# Patient Record
Sex: FEMALE | Race: OTHER | NOT HISPANIC OR LATINO | URBAN - METROPOLITAN AREA
[De-identification: names, ages, dates, MRNs, and addresses within clinical notes are randomized per-mention and may not be internally consistent; named-entity substitution may affect disease eponyms.]

---

## 2018-11-09 VITALS
DIASTOLIC BLOOD PRESSURE: 82 MMHG | TEMPERATURE: 98 F | HEART RATE: 76 BPM | SYSTOLIC BLOOD PRESSURE: 151 MMHG | OXYGEN SATURATION: 100 % | RESPIRATION RATE: 17 BRPM | WEIGHT: 126.1 LBS

## 2018-11-09 LAB
ALBUMIN SERPL ELPH-MCNC: 4.7 G/DL — SIGNIFICANT CHANGE UP (ref 3.3–5)
ALP SERPL-CCNC: 67 U/L — SIGNIFICANT CHANGE UP (ref 40–120)
ALT FLD-CCNC: 16 U/L — SIGNIFICANT CHANGE UP (ref 10–45)
ANION GAP SERPL CALC-SCNC: 16 MMOL/L — SIGNIFICANT CHANGE UP (ref 5–17)
APPEARANCE UR: CLEAR — SIGNIFICANT CHANGE UP
APTT BLD: 34.3 SEC — SIGNIFICANT CHANGE UP (ref 27.5–36.3)
AST SERPL-CCNC: 23 U/L — SIGNIFICANT CHANGE UP (ref 10–40)
BACTERIA # UR AUTO: PRESENT /HPF
BASOPHILS NFR BLD AUTO: 0.6 % — SIGNIFICANT CHANGE UP (ref 0–2)
BILIRUB SERPL-MCNC: 0.3 MG/DL — SIGNIFICANT CHANGE UP (ref 0.2–1.2)
BILIRUB UR-MCNC: NEGATIVE — SIGNIFICANT CHANGE UP
BUN SERPL-MCNC: 14 MG/DL — SIGNIFICANT CHANGE UP (ref 7–23)
CALCIUM SERPL-MCNC: 9.4 MG/DL — SIGNIFICANT CHANGE UP (ref 8.4–10.5)
CHLORIDE SERPL-SCNC: 95 MMOL/L — LOW (ref 96–108)
CO2 SERPL-SCNC: 23 MMOL/L — SIGNIFICANT CHANGE UP (ref 22–31)
COD CRY URNS QL: ABNORMAL /HPF
COLOR SPEC: YELLOW — SIGNIFICANT CHANGE UP
CREAT SERPL-MCNC: 0.75 MG/DL — SIGNIFICANT CHANGE UP (ref 0.5–1.3)
DIFF PNL FLD: ABNORMAL
EOSINOPHIL NFR BLD AUTO: 0.6 % — SIGNIFICANT CHANGE UP (ref 0–6)
GLUCOSE SERPL-MCNC: 109 MG/DL — HIGH (ref 70–99)
GLUCOSE UR QL: NEGATIVE — SIGNIFICANT CHANGE UP
HCT VFR BLD CALC: 41.2 % — SIGNIFICANT CHANGE UP (ref 34.5–45)
HGB BLD-MCNC: 13.5 G/DL — SIGNIFICANT CHANGE UP (ref 11.5–15.5)
INR BLD: 1.01 — SIGNIFICANT CHANGE UP (ref 0.88–1.16)
KETONES UR-MCNC: 15 MG/DL
LEUKOCYTE ESTERASE UR-ACNC: NEGATIVE — SIGNIFICANT CHANGE UP
LYMPHOCYTES # BLD AUTO: 23.1 % — SIGNIFICANT CHANGE UP (ref 13–44)
MCHC RBC-ENTMCNC: 28.6 PG — SIGNIFICANT CHANGE UP (ref 27–34)
MCHC RBC-ENTMCNC: 32.8 G/DL — SIGNIFICANT CHANGE UP (ref 32–36)
MCV RBC AUTO: 87.3 FL — SIGNIFICANT CHANGE UP (ref 80–100)
MONOCYTES NFR BLD AUTO: 7.7 % — SIGNIFICANT CHANGE UP (ref 2–14)
NEUTROPHILS NFR BLD AUTO: 68 % — SIGNIFICANT CHANGE UP (ref 43–77)
NITRITE UR-MCNC: NEGATIVE — SIGNIFICANT CHANGE UP
PH UR: 6.5 — SIGNIFICANT CHANGE UP (ref 5–8)
PLATELET # BLD AUTO: 217 K/UL — SIGNIFICANT CHANGE UP (ref 150–400)
POTASSIUM SERPL-MCNC: 4.3 MMOL/L — SIGNIFICANT CHANGE UP (ref 3.5–5.3)
POTASSIUM SERPL-SCNC: 4.3 MMOL/L — SIGNIFICANT CHANGE UP (ref 3.5–5.3)
PROT SERPL-MCNC: 7.3 G/DL — SIGNIFICANT CHANGE UP (ref 6–8.3)
PROT UR-MCNC: NEGATIVE MG/DL — SIGNIFICANT CHANGE UP
PROTHROM AB SERPL-ACNC: 11.4 SEC — SIGNIFICANT CHANGE UP (ref 10–12.9)
RBC # BLD: 4.72 M/UL — SIGNIFICANT CHANGE UP (ref 3.8–5.2)
RBC # FLD: 14.5 % — SIGNIFICANT CHANGE UP (ref 10.3–16.9)
RBC CASTS # UR COMP ASSIST: ABNORMAL /HPF
SODIUM SERPL-SCNC: 134 MMOL/L — LOW (ref 135–145)
SP GR SPEC: 1.02 — SIGNIFICANT CHANGE UP (ref 1–1.03)
UROBILINOGEN FLD QL: 0.2 E.U./DL — SIGNIFICANT CHANGE UP
WBC # BLD: 6.6 K/UL — SIGNIFICANT CHANGE UP (ref 3.8–10.5)
WBC # FLD AUTO: 6.6 K/UL — SIGNIFICANT CHANGE UP (ref 3.8–10.5)
WBC UR QL: < 5 /HPF — SIGNIFICANT CHANGE UP

## 2018-11-09 PROCEDURE — 99284 EMERGENCY DEPT VISIT MOD MDM: CPT

## 2018-11-09 PROCEDURE — 74177 CT ABD & PELVIS W/CONTRAST: CPT | Mod: 26

## 2018-11-09 RX ORDER — MORPHINE SULFATE 50 MG/1
4 CAPSULE, EXTENDED RELEASE ORAL ONCE
Qty: 0 | Refills: 0 | Status: DISCONTINUED | OUTPATIENT
Start: 2018-11-09 | End: 2018-11-11

## 2018-11-09 RX ORDER — IOHEXOL 300 MG/ML
30 INJECTION, SOLUTION INTRAVENOUS ONCE
Qty: 0 | Refills: 0 | Status: COMPLETED | OUTPATIENT
Start: 2018-11-09 | End: 2018-11-09

## 2018-11-09 RX ORDER — SODIUM CHLORIDE 9 MG/ML
1000 INJECTION INTRAMUSCULAR; INTRAVENOUS; SUBCUTANEOUS ONCE
Qty: 0 | Refills: 0 | Status: COMPLETED | OUTPATIENT
Start: 2018-11-09 | End: 2018-11-09

## 2018-11-09 RX ORDER — METOCLOPRAMIDE HCL 10 MG
10 TABLET ORAL ONCE
Qty: 0 | Refills: 0 | Status: DISCONTINUED | OUTPATIENT
Start: 2018-11-09 | End: 2018-11-11

## 2018-11-09 RX ORDER — ONDANSETRON 8 MG/1
4 TABLET, FILM COATED ORAL ONCE
Qty: 0 | Refills: 0 | Status: COMPLETED | OUTPATIENT
Start: 2018-11-09 | End: 2018-11-09

## 2018-11-09 RX ADMIN — SODIUM CHLORIDE 1000 MILLILITER(S): 9 INJECTION INTRAMUSCULAR; INTRAVENOUS; SUBCUTANEOUS at 23:54

## 2018-11-09 RX ADMIN — SODIUM CHLORIDE 1000 MILLILITER(S): 9 INJECTION INTRAMUSCULAR; INTRAVENOUS; SUBCUTANEOUS at 20:15

## 2018-11-09 RX ADMIN — IOHEXOL 30 MILLILITER(S): 300 INJECTION, SOLUTION INTRAVENOUS at 20:15

## 2018-11-09 RX ADMIN — ONDANSETRON 4 MILLIGRAM(S): 8 TABLET, FILM COATED ORAL at 21:13

## 2018-11-09 RX ADMIN — SODIUM CHLORIDE 1000 MILLILITER(S): 9 INJECTION INTRAMUSCULAR; INTRAVENOUS; SUBCUTANEOUS at 23:49

## 2018-11-09 NOTE — ED PROVIDER NOTE - OBJECTIVE STATEMENT
62 year old female with no significant PMH presents to ED for evaluation of abdominal pain.  Around 3PM she felt sudden onset of severe, sharp pain in her RLQ. Pain has been intermittent and same level since. She has some nausea but denies any vomiting. She denies associated fever, chills, diarrhea, constipation, urinary complaints. She has not taken anything for the pain. Had kidney stones and food poisoning in the past and feels this could be either of those problems.

## 2018-11-09 NOTE — ED PROVIDER NOTE - ATTENDING CONTRIBUTION TO CARE
63 yo F with RLQ x 4-5 hrs assoc with  nausea - no vomiting  no fevers or chills no flank pain- pos flatus and BM - Abd  pos RLQ tenderness mild guarding no rebound - no CVAT  A/P ? appy/SBO? - CT shows partial SBO >? dilated appendix surgery to eval for admission s/o to night team regarding dispo

## 2018-11-09 NOTE — ED PROVIDER NOTE - PROGRESS NOTE DETAILS
patient vomited most of oral contrast she had drank. Reglan 10 IVP ordered. Awaiting transport to CT. She is still refusing pain medication.

## 2018-11-09 NOTE — ED ADULT TRIAGE NOTE - CHIEF COMPLAINT QUOTE
Patient complaining of intermittent 8/10 right sided abdominal pain.  Patient denies any CP, SOB, dizziness, N/V/D, fevers, chills, cough or any other complaints at this time.

## 2018-11-09 NOTE — ED ADULT NURSE NOTE - NSIMPLEMENTINTERV_GEN_ALL_ED
Implemented All Universal Safety Interventions:  [place] to call system. Call bell, personal items and telephone within reach. Instruct patient to call for assistance. Room bathroom lighting operational. Non-slip footwear when patient is off stretcher. Physically safe environment: no spills, clutter or unnecessary equipment. Stretcher in lowest position, wheels locked, appropriate side rails in place.

## 2018-11-09 NOTE — ED PROVIDER NOTE - CARE PLAN
Principal Discharge DX:	Right lower quadrant abdominal pain Principal Discharge DX:	SBO (small bowel obstruction)

## 2018-11-10 ENCOUNTER — INPATIENT (INPATIENT)
Facility: HOSPITAL | Age: 63
LOS: 0 days | Discharge: HOME | DRG: 390 | End: 2018-11-11
Attending: STUDENT IN AN ORGANIZED HEALTH CARE EDUCATION/TRAINING PROGRAM | Admitting: STUDENT IN AN ORGANIZED HEALTH CARE EDUCATION/TRAINING PROGRAM
Payer: COMMERCIAL

## 2018-11-10 LAB
ANION GAP SERPL CALC-SCNC: 10 MMOL/L — SIGNIFICANT CHANGE UP (ref 5–17)
APTT BLD: 32.1 SEC — SIGNIFICANT CHANGE UP (ref 27.5–36.3)
BUN SERPL-MCNC: 7 MG/DL — SIGNIFICANT CHANGE UP (ref 7–23)
CALCIUM SERPL-MCNC: 8.8 MG/DL — SIGNIFICANT CHANGE UP (ref 8.4–10.5)
CHLORIDE SERPL-SCNC: 104 MMOL/L — SIGNIFICANT CHANGE UP (ref 96–108)
CO2 SERPL-SCNC: 24 MMOL/L — SIGNIFICANT CHANGE UP (ref 22–31)
CREAT SERPL-MCNC: 0.66 MG/DL — SIGNIFICANT CHANGE UP (ref 0.5–1.3)
GLUCOSE SERPL-MCNC: 95 MG/DL — SIGNIFICANT CHANGE UP (ref 70–99)
HCT VFR BLD CALC: 39 % — SIGNIFICANT CHANGE UP (ref 34.5–45)
HGB BLD-MCNC: 12.4 G/DL — SIGNIFICANT CHANGE UP (ref 11.5–15.5)
INR BLD: 1.03 — SIGNIFICANT CHANGE UP (ref 0.88–1.16)
MAGNESIUM SERPL-MCNC: 2.3 MG/DL — SIGNIFICANT CHANGE UP (ref 1.6–2.6)
MCHC RBC-ENTMCNC: 28.1 PG — SIGNIFICANT CHANGE UP (ref 27–34)
MCHC RBC-ENTMCNC: 31.8 G/DL — LOW (ref 32–36)
MCV RBC AUTO: 88.4 FL — SIGNIFICANT CHANGE UP (ref 80–100)
PHOSPHATE SERPL-MCNC: 3.1 MG/DL — SIGNIFICANT CHANGE UP (ref 2.5–4.5)
PLATELET # BLD AUTO: 202 K/UL — SIGNIFICANT CHANGE UP (ref 150–400)
POTASSIUM SERPL-MCNC: 3.6 MMOL/L — SIGNIFICANT CHANGE UP (ref 3.5–5.3)
POTASSIUM SERPL-SCNC: 3.6 MMOL/L — SIGNIFICANT CHANGE UP (ref 3.5–5.3)
PROTHROM AB SERPL-ACNC: 11.6 SEC — SIGNIFICANT CHANGE UP (ref 10–12.9)
RBC # BLD: 4.41 M/UL — SIGNIFICANT CHANGE UP (ref 3.8–5.2)
RBC # FLD: 14.7 % — SIGNIFICANT CHANGE UP (ref 10.3–16.9)
SODIUM SERPL-SCNC: 138 MMOL/L — SIGNIFICANT CHANGE UP (ref 135–145)
WBC # BLD: 5.5 K/UL — SIGNIFICANT CHANGE UP (ref 3.8–10.5)
WBC # FLD AUTO: 5.5 K/UL — SIGNIFICANT CHANGE UP (ref 3.8–10.5)

## 2018-11-10 PROCEDURE — 74019 RADEX ABDOMEN 2 VIEWS: CPT | Mod: 26

## 2018-11-10 RX ORDER — HEPARIN SODIUM 5000 [USP'U]/ML
5000 INJECTION INTRAVENOUS; SUBCUTANEOUS EVERY 8 HOURS
Qty: 0 | Refills: 0 | Status: DISCONTINUED | OUTPATIENT
Start: 2018-11-10 | End: 2018-11-11

## 2018-11-10 RX ORDER — POTASSIUM CHLORIDE 20 MEQ
10 PACKET (EA) ORAL
Qty: 0 | Refills: 0 | Status: COMPLETED | OUTPATIENT
Start: 2018-11-10 | End: 2018-11-10

## 2018-11-10 RX ORDER — PIPERACILLIN AND TAZOBACTAM 4; .5 G/20ML; G/20ML
3.38 INJECTION, POWDER, LYOPHILIZED, FOR SOLUTION INTRAVENOUS ONCE
Qty: 0 | Refills: 0 | Status: COMPLETED | OUTPATIENT
Start: 2018-11-10 | End: 2018-11-10

## 2018-11-10 RX ORDER — SODIUM CHLORIDE 9 MG/ML
1000 INJECTION, SOLUTION INTRAVENOUS
Qty: 0 | Refills: 0 | Status: DISCONTINUED | OUTPATIENT
Start: 2018-11-10 | End: 2018-11-11

## 2018-11-10 RX ADMIN — SODIUM CHLORIDE 1000 MILLILITER(S): 9 INJECTION INTRAMUSCULAR; INTRAVENOUS; SUBCUTANEOUS at 02:38

## 2018-11-10 RX ADMIN — PIPERACILLIN AND TAZOBACTAM 200 GRAM(S): 4; .5 INJECTION, POWDER, LYOPHILIZED, FOR SOLUTION INTRAVENOUS at 00:47

## 2018-11-10 RX ADMIN — PIPERACILLIN AND TAZOBACTAM 3.38 GRAM(S): 4; .5 INJECTION, POWDER, LYOPHILIZED, FOR SOLUTION INTRAVENOUS at 01:35

## 2018-11-10 RX ADMIN — HEPARIN SODIUM 5000 UNIT(S): 5000 INJECTION INTRAVENOUS; SUBCUTANEOUS at 14:00

## 2018-11-10 RX ADMIN — HEPARIN SODIUM 5000 UNIT(S): 5000 INJECTION INTRAVENOUS; SUBCUTANEOUS at 21:12

## 2018-11-10 RX ADMIN — Medication 100 MILLIEQUIVALENT(S): at 14:00

## 2018-11-10 RX ADMIN — Medication 100 MILLIEQUIVALENT(S): at 15:51

## 2018-11-10 RX ADMIN — SODIUM CHLORIDE 110 MILLILITER(S): 9 INJECTION, SOLUTION INTRAVENOUS at 04:34

## 2018-11-10 NOTE — H&P ADULT - NSHPPHYSICALEXAM_GEN_ALL_CORE
Vital Signs Last 24 Hrs  T(C): 36.9 (10 Nov 2018 02:17), Max: 36.9 (10 Nov 2018 02:17)  T(F): 98.5 (10 Nov 2018 02:17), Max: 98.5 (10 Nov 2018 02:17)  HR: 68 (10 Nov 2018 02:17) (68 - 76)  BP: 122/68 (10 Nov 2018 02:17) (122/68 - 151/82)  BP(mean): --  RR: 18 (10 Nov 2018 02:17) (17 - 18)  SpO2: 99% (10 Nov 2018 02:17) (99% - 100%)    General: A/O x4 NAD  Resp: CTABL  CV: RRR, No MRG  ABD: soft NT, ND

## 2018-11-10 NOTE — H&P ADULT - NSHPLABSRESULTS_GEN_ALL_CORE
13.5   6.6   )-----------( 217      ( 09 Nov 2018 20:15 )             41.2   11-09    134<L>  |  95<L>  |  14  ----------------------------<  109<H>  4.3   |  23  |  0.75    Ca    9.4      09 Nov 2018 20:15    TPro  7.3  /  Alb  4.7  /  TBili  0.3  /  DBili  x   /  AST  23  /  ALT  16  /  AlkPhos  67  11-09  < from: CT Abdomen and Pelvis w/ Oral Cont and w/ IV Cont (11.09.18 @ 22:20) >    FINDINGS:    Lower chest: Clear lung bases.     Liver: Smooth in contour. No focal lesion. Portal and hepatic veins are   patent.    Biliary system: Gallbladder is normal in size. No calcified gallstones.   No biliary ductal dilatation.    Pancreas: Unremarkable.    Spleen: Unremarkable.    Adrenal glands: Unremarkable.    Kidneys: Symmetric parenchymal enhancement. No renal mass. No   hydronephrosis. No renal or ureteral stone.   Urinary Bladder: Unremarkable.    Reproductive organs: Calcifications in the uterus consistent with   fibroid. No adnexal mass.    Bowel/Peritoneum: There are mildly dilated, stool-filled loops of small   bowel throughout the entire abdomen but more predominant on the right   side,with possible transition point at the distal ileum (axial slice   62).  This is likely a partial small bowel obstruction. Mild wall   thickening versus underdistention of the terminal ileum and entire colon,   possibly representing enterocolitis, infectious versus inflammatory. The   appendix is dilated to 0.8 cm with minimal periappendiceal fat stranding,   which is likely reactive. No ascites or extraluminal gas.     Lymph nodes: No lymphadenopathy.    Aorta/IVC: Normal caliber.    Abdominal wall: No hernia.    Bones/Soft tissues: Degenerative changes spine.       IMPRESSION:      There are mildly dilated, stool-filled loops of small bowel throughout   the entire abdomen but more predominant on the right side, with possible   transition point at the distal ileum (axial slice 62).  This is likely a   partial small bowel obstruction. Mild wall thickening versus   underdistention of the terminal ileum and entire colon, possibly   representing enterocolitis, infectious versus inflammatory. The appendix   is dilated to 0.8 cm with minimal periappendiceal fat stranding, which is   likely reactive. Early appendicitis cannot be excluded.     The  findings above were discussed with Dr. Lucas on 11/9/2018 at 11:55   PM.    < end of copied text >

## 2018-11-10 NOTE — H&P ADULT - ATTENDING COMMENTS
Patient seen and examined in ED  States pain is improved  Continue to pass gas  +BM (prior to CT)  Afebrile, Vitals stable  Abdomen soft, NT/ND    Labs reviewed: WBC 5.5  CT with evidence of partial small bowel obstruction and possible enterocolitis, infectious versus inflammatory. Early appendicitis cannot be excluded.    63 yo F with PMH nephrolithiasis and no PSH presents with acute onset abdominal pain, concern for SBO   - abdomen soft, passing gas, likely resolving partial SBO  - continue NPO/IVF  - f/u abdominal film  - discussed with patient the concern of bowel obstruction in setting of no past surgical history and that she may require diagnostic laparoscopy.  The patients exam has improved since admission and labs remain within normal limits.   Her abdomen is soft and non-distended.  Will continue to follow with bowel rest.  Early appendicitis read on CT likely reactive as patients pain has subsided. Patient seen and examined in ED  States pain is improved  Continue to pass gas  +BM (prior to CT)  Afebrile, Vitals stable  Abdomen soft, NT/ND    Labs reviewed: WBC 5.5  CT with evidence of partial small bowel obstruction and possible enterocolitis, infectious versus inflammatory. Early appendicitis cannot be excluded.    61 yo F with PMH nephrolithiasis and no PSH presents with acute onset abdominal pain, concern for SBO   - abdomen soft, passing gas, likely resolving partial SBO  - continue NPO/IVF  - f/u abdominal film  - discussed with patient the concern of bowel obstruction in setting of no past surgical history and that she may require diagnostic laparoscopy.  The patients exam has improved since admission and labs remain within normal limits.   Her abdomen is soft and non-distended.  Will continue to follow with bowel rest.  Early appendicitis read on CT likely reactive as patients pain has subsided.  If evidence of obstruction resolved on xray may feed.

## 2018-11-10 NOTE — H&P ADULT - ASSESSMENT
62 F PMHx renal stones, no PSHx presents with ABD pain x 1/2day, CT shows evidence of SBO    -Admit [facility], Dr Gold  -No Narcotics  -IVF  -NPO  -NGT if N/V  -serial abd exam  -SCD, SQH, OOB  -Discussed with Chief resident and Dr Gold

## 2018-11-10 NOTE — H&P ADULT - HISTORY OF PRESENT ILLNESS
62 F  PMHx kidney stones no PSx presents with abdominal pain x 1/2 day. Pain started suddenly at 4:30 pm as crampy gas like pain worse in RLQ and radiating to back and R flank with associated predominantly R side bloating. Denies N/V until drinking contrast and having large emesis and subsequent BM, with relief of pain. Denies F/C, CP, SOB, dysuria. Last c-scope 5 yrs ago was normal except for internal hemorrhoid. Denies family Hx colon ca. Denies personal and family Hx IBD.

## 2018-11-11 VITALS
OXYGEN SATURATION: 97 % | TEMPERATURE: 97 F | RESPIRATION RATE: 18 BRPM | SYSTOLIC BLOOD PRESSURE: 150 MMHG | HEART RATE: 71 BPM | DIASTOLIC BLOOD PRESSURE: 76 MMHG

## 2018-11-11 LAB
ANION GAP SERPL CALC-SCNC: 12 MMOL/L — SIGNIFICANT CHANGE UP (ref 5–17)
BUN SERPL-MCNC: 7 MG/DL — SIGNIFICANT CHANGE UP (ref 7–23)
CALCIUM SERPL-MCNC: 8.8 MG/DL — SIGNIFICANT CHANGE UP (ref 8.4–10.5)
CHLORIDE SERPL-SCNC: 104 MMOL/L — SIGNIFICANT CHANGE UP (ref 96–108)
CO2 SERPL-SCNC: 25 MMOL/L — SIGNIFICANT CHANGE UP (ref 22–31)
CREAT SERPL-MCNC: 0.72 MG/DL — SIGNIFICANT CHANGE UP (ref 0.5–1.3)
CULTURE RESULTS: SIGNIFICANT CHANGE UP
GLUCOSE SERPL-MCNC: 87 MG/DL — SIGNIFICANT CHANGE UP (ref 70–99)
HCT VFR BLD CALC: 36.5 % — SIGNIFICANT CHANGE UP (ref 34.5–45)
HGB BLD-MCNC: 11.7 G/DL — SIGNIFICANT CHANGE UP (ref 11.5–15.5)
MAGNESIUM SERPL-MCNC: 2.1 MG/DL — SIGNIFICANT CHANGE UP (ref 1.6–2.6)
MCHC RBC-ENTMCNC: 28.7 PG — SIGNIFICANT CHANGE UP (ref 27–34)
MCHC RBC-ENTMCNC: 32.1 G/DL — SIGNIFICANT CHANGE UP (ref 32–36)
MCV RBC AUTO: 89.7 FL — SIGNIFICANT CHANGE UP (ref 80–100)
PHOSPHATE SERPL-MCNC: 2.6 MG/DL — SIGNIFICANT CHANGE UP (ref 2.5–4.5)
PLATELET # BLD AUTO: 195 K/UL — SIGNIFICANT CHANGE UP (ref 150–400)
POTASSIUM SERPL-MCNC: 4 MMOL/L — SIGNIFICANT CHANGE UP (ref 3.5–5.3)
POTASSIUM SERPL-SCNC: 4 MMOL/L — SIGNIFICANT CHANGE UP (ref 3.5–5.3)
RBC # BLD: 4.07 M/UL — SIGNIFICANT CHANGE UP (ref 3.8–5.2)
RBC # FLD: 14.8 % — SIGNIFICANT CHANGE UP (ref 10.3–16.9)
SODIUM SERPL-SCNC: 141 MMOL/L — SIGNIFICANT CHANGE UP (ref 135–145)
SPECIMEN SOURCE: SIGNIFICANT CHANGE UP
WBC # BLD: 4.2 K/UL — SIGNIFICANT CHANGE UP (ref 3.8–10.5)
WBC # FLD AUTO: 4.2 K/UL — SIGNIFICANT CHANGE UP (ref 3.8–10.5)

## 2018-11-11 PROCEDURE — 83735 ASSAY OF MAGNESIUM: CPT

## 2018-11-11 PROCEDURE — 96365 THER/PROPH/DIAG IV INF INIT: CPT | Mod: XU

## 2018-11-11 PROCEDURE — 85025 COMPLETE CBC W/AUTO DIFF WBC: CPT

## 2018-11-11 PROCEDURE — 84100 ASSAY OF PHOSPHORUS: CPT

## 2018-11-11 PROCEDURE — 96361 HYDRATE IV INFUSION ADD-ON: CPT

## 2018-11-11 PROCEDURE — 85730 THROMBOPLASTIN TIME PARTIAL: CPT

## 2018-11-11 PROCEDURE — 99285 EMERGENCY DEPT VISIT HI MDM: CPT | Mod: 25

## 2018-11-11 PROCEDURE — 85027 COMPLETE CBC AUTOMATED: CPT

## 2018-11-11 PROCEDURE — 74177 CT ABD & PELVIS W/CONTRAST: CPT

## 2018-11-11 PROCEDURE — 85610 PROTHROMBIN TIME: CPT

## 2018-11-11 PROCEDURE — 87086 URINE CULTURE/COLONY COUNT: CPT

## 2018-11-11 PROCEDURE — 80053 COMPREHEN METABOLIC PANEL: CPT

## 2018-11-11 PROCEDURE — 81001 URINALYSIS AUTO W/SCOPE: CPT

## 2018-11-11 PROCEDURE — 80048 BASIC METABOLIC PNL TOTAL CA: CPT

## 2018-11-11 PROCEDURE — 36415 COLL VENOUS BLD VENIPUNCTURE: CPT

## 2018-11-11 PROCEDURE — 74019 RADEX ABDOMEN 2 VIEWS: CPT

## 2018-11-11 RX ADMIN — HEPARIN SODIUM 5000 UNIT(S): 5000 INJECTION INTRAVENOUS; SUBCUTANEOUS at 05:23

## 2018-11-11 NOTE — DISCHARGE NOTE ADULT - PLAN OF CARE
RECOVERY You do not have to follow up with Dr. Gold. However, you may if you want. Call the number provided below to make an appointment. You should remain on full liquid diet for 3-5 days and advance as tolerate. You may resume you daily activities.    You should return to ER if you are experiencing any fever, chills, nausea, vomiting, Chest pain, shortness of breath and uncontrolled pain.

## 2018-11-11 NOTE — PROGRESS NOTE ADULT - SUBJECTIVE AND OBJECTIVE BOX
SUBJECTIVE: Pt seen and examined at bedside with chief. Pt denies any complaints. Pain well controlled. Tolerating diet without N/V. Admits passing flatus    MEDICATIONS  (STANDING):  heparin  Injectable 5000 Unit(s) SubCutaneous every 8 hours    MEDICATIONS  (PRN):  metoclopramide Injectable 10 milliGRAM(s) IV Push once PRN nausea/vomiting  morphine  - Injectable 4 milliGRAM(s) IV Push Once PRN Severe Pain (7 - 10)      Vital Signs Last 24 Hrs  T(C): 36.1 (2018 08:30), Max: 36.8 (10 Nov 2018 11:49)  T(F): 97 (2018 08:30), Max: 98.2 (10 Nov 2018 11:49)  HR: 58 (2018 08:30) (58 - 68)  BP: 118/58 (2018 08:30) (92/52 - 129/62)  BP(mean): --  RR: 17 (2018 08:30) (15 - 18)  SpO2: 99% (2018 08:30) (97% - 100%)    PHYSICAL EXAM:      Constitutional: A&Ox3    Respiratory: non labored breathing, no respiratory distress    Cardiovascular: NSR, RRR    Gastrointestinal: soft ND,NT, No rebound or guarding    Genitourinary: voiding     Extremities: (-) edema                  I&O's Detail    10 Nov 2018 07:01  -  2018 07:00  --------------------------------------------------------  IN:    lactated ringers.: 1320 mL  Total IN: 1320 mL    OUT:    Voided: 800 mL  Total OUT: 800 mL    Total NET: 520 mL          LABS:                        11.7   4.2   )-----------( 195      ( 2018 07:18 )             36.5         141  |  104  |  7   ----------------------------<  87  4.0   |  25  |  0.72    Ca    8.8      2018 07:18  Phos  2.6       Mg     2.1         TPro  7.3  /  Alb  4.7  /  TBili  0.3  /  DBili  x   /  AST  23  /  ALT  16  /  AlkPhos  67      PT/INR - ( 10 Nov 2018 09:59 )   PT: 11.6 sec;   INR: 1.03          PTT - ( 10 Nov 2018 09:59 )  PTT:32.1 sec  Urinalysis Basic - ( 2018 19:32 )    Color: Yellow / Appearance: Clear / S.020 / pH: x  Gluc: x / Ketone: 15 mg/dL  / Bili: Negative / Urobili: 0.2 E.U./dL   Blood: x / Protein: NEGATIVE mg/dL / Nitrite: NEGATIVE   Leuk Esterase: NEGATIVE / RBC: 5-10 /HPF / WBC < 5 /HPF   Sq Epi: x / Non Sq Epi: x / Bacteria: Present /HPF        RADIOLOGY & ADDITIONAL STUDIES:

## 2018-11-11 NOTE — DISCHARGE NOTE ADULT - HOSPITAL COURSE
62 F  PMHx kidney stones no PSx presents with abdominal pain x 1/2 day. Pain started suddenly at 4:30 pm as crampy gas like pain worse in RLQ and radiating to back and R flank with associated predominantly R side bloating. Denies N/V until drinking contrast and having large emesis and subsequent BM, with relief of pain. Denies F/C, CP, SOB, dysuria. Last c-scope 5 yrs ago was normal except for internal hemorrhoid. Denies family Hx colon ca. Denies personal and family Hx IBD. CT performed suggestive of SBO. During hospital stay, pt was advanced to a regular diet and tolerated well. Pt voided adequately and remained hemodynamically stable. At time of discharge pt was stable.

## 2018-11-11 NOTE — PROGRESS NOTE ADULT - ASSESSMENT
62 F PMHx renal stones, no PSHx presents with ABD pain x 1/2day, CT shows evidence of SBO    pain/nausea control without narcotics  IS/OOB  reg diet  SCDs, HSQ  d/c home

## 2018-11-11 NOTE — DISCHARGE NOTE ADULT - PATIENT PORTAL LINK FT
You can access the [facility] Portal, offered by [facility], by registering with the following website: [email or web address]

## 2018-11-11 NOTE — DISCHARGE NOTE ADULT - CARE PLAN
Principal Discharge DX:	SBO (small bowel obstruction)  Goal:	RECOVERY  Assessment and plan of treatment:	You do not have to follow up with Dr. Gold. However, you may if you want. Call the number provided below to make an appointment. You should remain on full liquid diet for 3-5 days and advance as tolerate. You may resume you daily activities.    You should return to ER if you are experiencing any fever, chills, nausea, vomiting, Chest pain, shortness of breath and uncontrolled pain.

## 2018-11-16 DIAGNOSIS — K56.609 UNSPECIFIED INTESTINAL OBSTRUCTION, UNSPECIFIED AS TO PARTIAL VERSUS COMPLETE OBSTRUCTION: ICD-10-CM

## 2024-03-13 NOTE — DISCHARGE NOTE ADULT - REASON FOR ADMISSION
Chronic Disease Management  Called patient to complete Pulmonary Disease Management Questionnaire.    Time  spent with patient:  Minutes   SBO

## 2024-04-03 NOTE — ED PROVIDER NOTE - ABDOMINAL EXAM
Stephen Pascual returns call. Reviewed results with her, right lower lobe lung nodule biopsy positive for lung primary. Plan for PFTs followed by office visit with Dr. Kee Damon. Explained that we will assist her in getting scheduled for PFTs and arrange for an office visit to follow. She prefers [facility] - any day / time available. Otherwise willing to go to [place] or [facility]. All questions answered. No further questions at this time. Encouraged her to call office back at any time in the instance additional concerns or questions arise. Cytologic Interpretation   Right lung, core biopsy for nodule:  - Positive for malignancy; Adenocarcinoma, predominantly lepidic pattern, consistent with lung primary.  - The lung carcinoma prognostic panel has been ordered; please see addenda.       Electronically signed by Judy Gipson MD on 4/2/2024 at 2500 soft/no organomegaly/nondistended/tender.../no pulsating masses